# Patient Record
Sex: MALE | NOT HISPANIC OR LATINO | URBAN - METROPOLITAN AREA
[De-identification: names, ages, dates, MRNs, and addresses within clinical notes are randomized per-mention and may not be internally consistent; named-entity substitution may affect disease eponyms.]

---

## 2024-03-25 ENCOUNTER — HOSPITAL ENCOUNTER (OUTPATIENT)
Dept: RADIOLOGY | Facility: CLINIC | Age: 69
Discharge: HOME | End: 2024-03-25
Payer: MEDICARE

## 2024-03-25 DIAGNOSIS — Z13.6 ENCOUNTER FOR SCREENING FOR CARDIOVASCULAR DISORDERS: ICD-10-CM

## 2024-03-25 DIAGNOSIS — E78.2 MIXED HYPERLIPIDEMIA: ICD-10-CM

## 2024-03-25 DIAGNOSIS — I25.10 ATHEROSCLEROTIC HEART DISEASE OF NATIVE CORONARY ARTERY WITHOUT ANGINA PECTORIS: ICD-10-CM

## 2024-03-25 DIAGNOSIS — I10 ESSENTIAL (PRIMARY) HYPERTENSION: ICD-10-CM

## 2024-03-25 PROCEDURE — 75571 CT HRT W/O DYE W/CA TEST: CPT

## 2024-04-12 ENCOUNTER — APPOINTMENT (OUTPATIENT)
Dept: CARDIOLOGY | Facility: HOSPITAL | Age: 69
End: 2024-04-12
Payer: MEDICARE

## 2024-04-12 ENCOUNTER — HOSPITAL ENCOUNTER (EMERGENCY)
Facility: HOSPITAL | Age: 69
Discharge: HOME | End: 2024-04-13
Attending: STUDENT IN AN ORGANIZED HEALTH CARE EDUCATION/TRAINING PROGRAM
Payer: MEDICARE

## 2024-04-12 VITALS
SYSTOLIC BLOOD PRESSURE: 151 MMHG | BODY MASS INDEX: 27.2 KG/M2 | RESPIRATION RATE: 18 BRPM | HEIGHT: 70 IN | TEMPERATURE: 98.4 F | HEART RATE: 70 BPM | OXYGEN SATURATION: 95 % | WEIGHT: 190 LBS | DIASTOLIC BLOOD PRESSURE: 79 MMHG

## 2024-04-12 DIAGNOSIS — R45.851 THREATENING SUICIDE: Primary | ICD-10-CM

## 2024-04-12 LAB
ALBUMIN SERPL BCP-MCNC: 4.6 G/DL (ref 3.4–5)
ALP SERPL-CCNC: 61 U/L (ref 33–136)
ALT SERPL W P-5'-P-CCNC: 32 U/L (ref 10–52)
ANION GAP SERPL CALC-SCNC: 17 MMOL/L (ref 10–20)
APAP SERPL-MCNC: <10 UG/ML
AST SERPL W P-5'-P-CCNC: 33 U/L (ref 9–39)
BASOPHILS # BLD AUTO: 0.01 X10*3/UL (ref 0–0.1)
BASOPHILS NFR BLD AUTO: 0.2 %
BILIRUB SERPL-MCNC: 0.7 MG/DL (ref 0–1.2)
BUN SERPL-MCNC: 20 MG/DL (ref 6–23)
CALCIUM SERPL-MCNC: 9.9 MG/DL (ref 8.6–10.3)
CHLORIDE SERPL-SCNC: 103 MMOL/L (ref 98–107)
CO2 SERPL-SCNC: 26 MMOL/L (ref 21–32)
CREAT SERPL-MCNC: 1.08 MG/DL (ref 0.5–1.3)
EGFRCR SERPLBLD CKD-EPI 2021: 76 ML/MIN/1.73M*2
EOSINOPHIL # BLD AUTO: 0.25 X10*3/UL (ref 0–0.7)
EOSINOPHIL NFR BLD AUTO: 5.5 %
ERYTHROCYTE [DISTWIDTH] IN BLOOD BY AUTOMATED COUNT: 12.1 % (ref 11.5–14.5)
ETHANOL SERPL-MCNC: 154 MG/DL
GLUCOSE SERPL-MCNC: 91 MG/DL (ref 74–99)
HCT VFR BLD AUTO: 45.3 % (ref 41–52)
HGB BLD-MCNC: 15.8 G/DL (ref 13.5–17.5)
IMM GRANULOCYTES # BLD AUTO: 0.02 X10*3/UL (ref 0–0.7)
IMM GRANULOCYTES NFR BLD AUTO: 0.4 % (ref 0–0.9)
LYMPHOCYTES # BLD AUTO: 1.13 X10*3/UL (ref 1.2–4.8)
LYMPHOCYTES NFR BLD AUTO: 24.7 %
MCH RBC QN AUTO: 31.5 PG (ref 26–34)
MCHC RBC AUTO-ENTMCNC: 34.9 G/DL (ref 32–36)
MCV RBC AUTO: 90 FL (ref 80–100)
MONOCYTES # BLD AUTO: 0.51 X10*3/UL (ref 0.1–1)
MONOCYTES NFR BLD AUTO: 11.1 %
NEUTROPHILS # BLD AUTO: 2.66 X10*3/UL (ref 1.2–7.7)
NEUTROPHILS NFR BLD AUTO: 58.1 %
NRBC BLD-RTO: 0 /100 WBCS (ref 0–0)
PLATELET # BLD AUTO: 191 X10*3/UL (ref 150–450)
POTASSIUM SERPL-SCNC: 3.9 MMOL/L (ref 3.5–5.3)
PROT SERPL-MCNC: 7 G/DL (ref 6.4–8.2)
RBC # BLD AUTO: 5.02 X10*6/UL (ref 4.5–5.9)
SALICYLATES SERPL-MCNC: <3 MG/DL
SODIUM SERPL-SCNC: 142 MMOL/L (ref 136–145)
WBC # BLD AUTO: 4.6 X10*3/UL (ref 4.4–11.3)

## 2024-04-12 PROCEDURE — 93005 ELECTROCARDIOGRAM TRACING: CPT

## 2024-04-12 PROCEDURE — 80143 DRUG ASSAY ACETAMINOPHEN: CPT | Performed by: PHYSICIAN ASSISTANT

## 2024-04-12 PROCEDURE — 36415 COLL VENOUS BLD VENIPUNCTURE: CPT | Performed by: PHYSICIAN ASSISTANT

## 2024-04-12 PROCEDURE — 80053 COMPREHEN METABOLIC PANEL: CPT | Performed by: PHYSICIAN ASSISTANT

## 2024-04-12 PROCEDURE — 99285 EMERGENCY DEPT VISIT HI MDM: CPT | Performed by: STUDENT IN AN ORGANIZED HEALTH CARE EDUCATION/TRAINING PROGRAM

## 2024-04-12 PROCEDURE — 85025 COMPLETE CBC W/AUTO DIFF WBC: CPT | Performed by: PHYSICIAN ASSISTANT

## 2024-04-12 SDOH — HEALTH STABILITY: MENTAL HEALTH: NEEDS EXPRESSED: EMOTIONAL

## 2024-04-12 SDOH — SOCIAL STABILITY: SOCIAL NETWORK: EMOTIONAL SUPPORT GIVEN: REASSURE

## 2024-04-12 SDOH — HEALTH STABILITY: MENTAL HEALTH

## 2024-04-12 SDOH — HEALTH STABILITY: MENTAL HEALTH: BEHAVIORS/MOOD: APPROPRIATE FOR AGE;CALM;COOPERATIVE;VERBAL;TEARFUL

## 2024-04-12 ASSESSMENT — PAIN - FUNCTIONAL ASSESSMENT: PAIN_FUNCTIONAL_ASSESSMENT: 0-10

## 2024-04-12 ASSESSMENT — PAIN SCALES - GENERAL
PAINLEVEL_OUTOF10: 0 - NO PAIN
PAINLEVEL_OUTOF10: 0 - NO PAIN

## 2024-04-13 ENCOUNTER — DOCUMENTATION (OUTPATIENT)
Dept: SOCIAL WORK | Age: 69
End: 2024-04-13
Payer: MEDICARE

## 2024-04-13 LAB
AMPHETAMINES UR QL SCN: NORMAL
APPEARANCE UR: CLEAR
BARBITURATES UR QL SCN: NORMAL
BENZODIAZ UR QL SCN: NORMAL
BILIRUB UR STRIP.AUTO-MCNC: NEGATIVE MG/DL
BZE UR QL SCN: NORMAL
CANNABINOIDS UR QL SCN: NORMAL
COLOR UR: ABNORMAL
FENTANYL+NORFENTANYL UR QL SCN: NORMAL
GLUCOSE UR STRIP.AUTO-MCNC: NEGATIVE MG/DL
HOLD SPECIMEN: NORMAL
KETONES UR STRIP.AUTO-MCNC: ABNORMAL MG/DL
LEUKOCYTE ESTERASE UR QL STRIP.AUTO: NEGATIVE
METHADONE UR QL SCN: NORMAL
NITRITE UR QL STRIP.AUTO: NEGATIVE
OPIATES UR QL SCN: NORMAL
OXYCODONE+OXYMORPHONE UR QL SCN: NORMAL
PCP UR QL SCN: NORMAL
PH UR STRIP.AUTO: 6 [PH]
PROT UR STRIP.AUTO-MCNC: NEGATIVE MG/DL
RBC # UR STRIP.AUTO: NEGATIVE /UL
SP GR UR STRIP.AUTO: 1.01
UROBILINOGEN UR STRIP.AUTO-MCNC: <2 MG/DL

## 2024-04-13 PROCEDURE — 81003 URINALYSIS AUTO W/O SCOPE: CPT | Performed by: PHYSICIAN ASSISTANT

## 2024-04-13 PROCEDURE — 80307 DRUG TEST PRSMV CHEM ANLYZR: CPT | Performed by: PHYSICIAN ASSISTANT

## 2024-04-13 SDOH — HEALTH STABILITY: MENTAL HEALTH: WISH TO BE DEAD (PAST 1 MONTH): NO

## 2024-04-13 SDOH — ECONOMIC STABILITY: HOUSING INSECURITY: FEELS SAFE LIVING IN HOME: YES

## 2024-04-13 SDOH — HEALTH STABILITY: MENTAL HEALTH: HAVE YOU EVER TRIED TO KILL YOURSELF?: NO

## 2024-04-13 SDOH — HEALTH STABILITY: MENTAL HEALTH: ARE YOU HAVING THOUGHTS OF KILLING YOURSELF RIGHT NOW?: NO

## 2024-04-13 SDOH — HEALTH STABILITY: MENTAL HEALTH: IN THE PAST WEEK, HAVE YOU BEEN HAVING THOUGHTS ABOUT KILLING YOURSELF?: YES

## 2024-04-13 SDOH — HEALTH STABILITY: MENTAL HEALTH: ACTIVE SUICIDAL IDEATION WITH SOME INTENT TO ACT, WITHOUT SPECIFIC PLAN (PAST 1 MONTH): NO

## 2024-04-13 SDOH — HEALTH STABILITY: MENTAL HEALTH: NON-SPECIFIC ACTIVE SUICIDAL THOUGHTS (PAST 1 MONTH): YES

## 2024-04-13 SDOH — HEALTH STABILITY: MENTAL HEALTH: SUICIDAL BEHAVIOR (3 MONTHS): NO

## 2024-04-13 SDOH — HEALTH STABILITY: MENTAL HEALTH: IN THE PAST FEW WEEKS, HAVE YOU WISHED YOU WERE DEAD?: NO

## 2024-04-13 SDOH — HEALTH STABILITY: MENTAL HEALTH: DEPRESSION SYMPTOMS: NO PROBLEMS REPORTED OR OBSERVED.

## 2024-04-13 SDOH — HEALTH STABILITY: MENTAL HEALTH: IN THE PAST FEW WEEKS, HAVE YOU FELT THAT YOU OR YOUR FAMILY WOULD BE BETTER OFF IF YOU WERE DEAD?: NO

## 2024-04-13 SDOH — HEALTH STABILITY: MENTAL HEALTH: SUICIDAL BEHAVIOR (LIFETIME): NO

## 2024-04-13 SDOH — HEALTH STABILITY: MENTAL HEALTH: ANXIETY SYMPTOMS: NO PROBLEMS REPORTED OR OBSERVED.

## 2024-04-13 SDOH — HEALTH STABILITY: MENTAL HEALTH: ACTIVE SUICIDAL IDEATION WITH SPECIFIC PLAN AND INTENT (PAST 1 MONTH): NO

## 2024-04-13 ASSESSMENT — LIFESTYLE VARIABLES
SUBSTANCE_ABUSE_PAST_12_MONTHS: NO
PRESCIPTION_ABUSE_PAST_12_MONTHS: NO

## 2024-04-13 NOTE — ED PROVIDER NOTES
HPI   Chief Complaint   Patient presents with    Psychiatric Evaluation       Is a 68-year-old male coming in after being pink slipped by the police.  Patient was at home in an argument with his significant other.  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  Since that time patient has had increased arguing with his significant other.  Patient also reports daily alcohol use after multiple years sober within the last year.  Patient denies any drug use.  He does not take any daily medications.  He states he saw therapists and counselors for his arguments with his significant other in the past however currently does not.  Today patient states that the argument was louder and heavier than usual.  The significant other told him that she wished that he was dead.  He states he went downstairs and grabbed a gun and gave it to her and told her to shoot him.  Patient was then brought in here by police.  He states he regrets what he said.  He does not feel suicidal or homicidal.      History provided by:  Patient                      [place] Coma Scale Score: 15                     Patient History   No past medical history on file.  No past surgical history on file.  No family history on file.  Social History     Tobacco Use    Smoking status: Not on file    Smokeless tobacco: Not on file   Substance Use Topics    Alcohol use: Not on file    Drug use: Not on file       Physical Exam   ED Triage Vitals [04/12/24 2236]   Temperature Heart Rate Respirations BP   36.9 °C (98.4 °F) 70 18 151/79      Pulse Ox Temp Source Heart Rate Source Patient Position   95 % Temporal Monitor Lying      BP Location FiO2 (%)     Right arm --       Physical Exam  Vitals and nursing note reviewed.   Constitutional:       General: He is not in acute distress.     Appearance: Normal appearance. He is not toxic-appearing.   HENT:      Head: Normocephalic and  "atraumatic.      Nose: Nose normal.      Mouth/Throat:      Mouth: Mucous membranes are moist.      Pharynx: Oropharynx is clear.   Eyes:      Extraocular Movements: Extraocular movements intact.      Conjunctiva/sclera: Conjunctivae normal.      Pupils: Pupils are equal, round, and reactive to light.   Cardiovascular:      Rate and Rhythm: Regular rhythm.      Pulses: Normal pulses.      Heart sounds: Normal heart sounds.   Pulmonary:      Effort: Pulmonary effort is normal. No respiratory distress.      Breath sounds: Normal breath sounds.   Abdominal:      General: Abdomen is flat. Bowel sounds are normal.      Palpations: Abdomen is soft.      Tenderness: There is no abdominal tenderness.   Musculoskeletal:         General: Normal range of motion.      Cervical back: Normal range of motion and neck supple.   Skin:     General: Skin is warm and dry.      Coloration: Skin is not jaundiced or pale.      Findings: No bruising.   Neurological:      General: No focal deficit present.      Mental Status: He is alert and oriented to person, place, and time. Mental status is at baseline.   Psychiatric:         Mood and Affect: Mood normal.         Speech: Speech normal.         Behavior: Behavior normal. Behavior is cooperative.         Thought Content: Thought content normal. Thought content does not include homicidal or suicidal ideation. Thought content does not include homicidal or suicidal plan.         ED Course & MDM   ED Course as of 04/13/24 0019   Sat Apr 13, 2024   0008 This is a 68-year-old male present with chief complaint of psych evaluation. [WL]   0009  [place] slip by Asher HORN. Pt was making suicidal threats to his wife. Back story is that pt was emotionally distressed and his wife said, \"you should be dead\" and pt went to basement, got an unloaded shotgun and gave it to his wife and said, \"fine, you do it.\" Pt states he had \"a couple drinks but nothing to impair my judgement.\"  [WL]      ED Course User " Index  [WL] Mumtaz Kelley DO       Medical Decision Making  Summary:  Medical Decision Making:   Patient presented as described in HPI. Patient case including ROS, PE, and treatment and plan discussed with ED attending if attached as cosigner. Results from labs and or imaging included below if completed. Jorge Stratton  is a 68 y.o. coming in for Patient presents with:  Psychiatric Evaluation  .  Due to patient's actions that include grabbing a gun a workup will be completed to medically evaluate the patient.  Alcohol is slightly elevated.  Clinically I do believe that the patient is sober.  Awaiting urinalysis and urine drug screen.  Patient denies drug use.  He is medically cleared at 12:20 AM.  Psychiatric team evaluation was placed and patient will be handed off to ER attending for disposition and plan after psychiatric team assessment.          Labs Reviewed  ACUTE TOXICOLOGY PANEL, BLOOD - Abnormal     Acetaminophen                 <10.0                  Salicylate                    <3                     Alcohol                       154 (*)             CBC WITH AUTO DIFFERENTIAL - Abnormal     WBC                           4.6                    nRBC                          0.0                    RBC                           5.02                   Hemoglobin                    15.8                   Hematocrit                    45.3                   MCV                           90                     MCH                           31.5                   MCHC                          34.9                   RDW                           12.1                   Platelets                     191                    Neutrophils %                 58.1                   Immature Granulocytes %, Automated   0.4                    Lymphocytes %                 24.7                   Monocytes %                   11.1                   Eosinophils %                 5.5                    Basophils %                    0.2                    Neutrophils Absolute          2.66                   Immature Granulocytes Absolute, Au*   0.02                   Lymphocytes Absolute          1.13 (*)               Monocytes Absolute            0.51                   Eosinophils Absolute          0.25                   Basophils Absolute            0.01                COMPREHENSIVE METABOLIC PANEL - Normal     Glucose                       91                     Sodium                        142                    Potassium                     3.9                    Chloride                      103                    Bicarbonate                   26                     Anion Gap                     17                     Urea Nitrogen                 20                     Creatinine                    1.08                   eGFR                          76                     Calcium                       9.9                    Albumin                       4.6                    Alkaline Phosphatase          61                     Total Protein                 7.0                    AST                           33                     Bilirubin, Total              0.7                    ALT                           32                  URINALYSIS WITH REFLEX CULTURE AND MICROSCOPIC       Narrative: The following orders were created for panel order Urinalysis with Reflex Culture and Microscopic.                Procedure                               Abnormality         Status                                   ---------                               -----------         ------                                   Urinalysis with Reflex C...[[id number]]                      In process                               Extra Urine Gray Tube[[id number]]                            In process                                               Please view results for these tests on the individual orders.  DRUG SCREEN,URINE  URINALYSIS WITH REFLEX CULTURE  "AND MICROSCOPIC  EXTRA URINE GRAY TUBE   No orders to display      Tests/Medications/Escalations of Care considered but not given: As in [facility]    Patient care discussed with: N/A  Social Determinants affecting care: N/A    Final diagnosis and disposition as documented     ED Course as of 04/13/24 0022  ------------------------------------------------------------  Time: 04/13 0008  Comment: This is a 68-year-old male present with chief complaint of psych evaluation.  By: Mumtaz Kelley,   ------------------------------------------------------------  Time: 04/13 0009  Comment:  [place] slip by Asher HORN. Pt was making suicidal threats to his wife. Back story is that pt was emotionally distressed and his wife said, \"you should be dead\" and pt went to basement, got an unloaded shotgun and gave it to his wife and said, \"fine, you do it.\" Pt states he had \"a couple drinks but nothing to impair my judgement.\"   By: Mumtaz Kelley,            This note has been transcribed using voice recognition and may contain grammatical errors, misplaced words, incorrect words, incorrect phrases or other errors.         Procedure  Procedures     Bernardo Hendrix PA-C  04/13/24 0023    "

## 2024-04-13 NOTE — ED TRIAGE NOTES
"Pt BIB EMS f/ home. [place] slip by Asher HORN. Pt was making suicidal threats to his wife. Back story is that pt was emotionally distressed and his wife said, \"you should be dead\" and pt went to basement, got an unloaded shotgun and gave it to his wife and said, \"fine, you do it.\" Pt states he had \"a couple drinks but nothing to impair my judgement.\" Pt calm, cooperative, NAD, VSS en route. Currently denies SI or HI. Voluntary to psych evaluation.   "

## 2024-04-13 NOTE — PROGRESS NOTES
EPAT - Social Work Psychiatric Assessment    Arrival Details  Mode of Arrival: Ambulance  Admission Source: Home  Admission Type: Involuntary  EPAT Assessment Start Date: 04/13/24  EPAT Assessment Start Time: 0025  Name of : IVÁN Whittington LSW    History of Present Illness  Admission Reason: suicidal ideation  HPI: Patient is a 68 year old  male, with no psych history, pink slipped by PD to the ED for suicidal gestures. ED provider note, nursing notes, [place] suicide risk scale and community records reviewed, patient reportedly has been having escalating arguments with spouse in the past several days. Today during argument, her wife said “you should be dead”, patient went to basement, got an unloaded shotgun and gave it to wife saying “fine, you do it”. Upon ED arrival, patient denies suicidal/homicidal ideation, visual/auditory hallucinations or delusional thinking. Triage indicates high risk, BAL at 154 and negative UDS. Patient has never been diagnosed with any psychiatric illness, he has been to couple counseling with spouse in the past. Denies previous SA or NSSIB.     Readmission Information   Readmission within 30 Days: No    Psychiatric Symptoms  Anxiety Symptoms: No problems reported or observed.  Depression Symptoms: No problems reported or observed.  Yajaira Symptoms: No problems reported or observed.    Psychosis Symptoms  Hallucination Type: No problems reported or observed.  Delusion Type: No problems reported or observed.    Additional Symptoms - Adult  Generalized Anxiety Disorder: Difficult to control worry, Difficulity concentrating, Excessive anxiety/worry, Easily fatigued, Irritability  Obsessive Compulsive Disorder: No problems reported or observed.  Panic Attack: No problems reported or observed.  Post Traumatic Stress Disorder: No problems reported or observed.  Delirium: No problems reported or observed.    Past Psychiatric History/Meds/Treatments  Past Psychiatric History:  no prior admissions // no hx of SA or NSSIB // denies family hx // denies trauma hx  Past Psychiatric Meds/Treatments: none  Past Violence/Victimization History: none    Current Mental Health Contacts   Name/Phone Number: none   Last Appointment Date: none  Provider Name/Phone Number: none  Provider Last Appointment Date: none    Support System: Immediate family    Living Arrangement: House    Home Safety  Feels Safe Living in Home: Yes    Income Information  Employment Status for: Patient  Employment Status: Employed  Income Source: Employed  Current/Previous Occupation:  (finance)    [place] Service/Education History  Current or Previous  Service: None  Education Level:  (did not assess)    Social/Cultural History  Social History: [place] citizen, living with spouse in a house in [place] [facility]. 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 Has two adult sons.  Cultural Requests During Hospitalization: none  Spiritual Requests During Hospitalization: none  Important Activities: Hobbies (bird hunting, training hunting dogs)    Legal  Legal Considerations: Patient/ Family Ability to Make Healthcare Decisions  Assistance with Managing/Advocating Healthcare Needs:  (self)  Criminal Activity/ Legal Involvement Pertinent to Current Situation/ Hospitalization: none    Drug Screening  Have you used any substances (canabis, cocaine, heroin, hallucinogens, inhalants, etc.) in the past 12 months?: No  Have you used any prescription drugs other than prescribed in the past 12 months?: No  Is a toxicology screen needed?: Yes    Stage of Change  Stage of Change: Preparation  History of Treatment:  (none)  Type of Treatment Offered: AA/NA meeting resource  Treatment Offered: Declined  Duration of Substance Use: years  Frequency of Substance Use: been sober for 4 years in the past, restarted drinking 1.5 years ago, drinks daily  Age of First Substance Use: unknown    Psychosocial  Psychosocial (WDL):  Within Defined Limits  Behaviors/Mood: Cooperative    Orientation  Orientation Level: Oriented X4    General Appearance  Motor Activity: Unremarkable  Speech Pattern:  (regular rate and tone)  General Attitude: Cooperative  Appearance/Hygiene: Unremarkable    Thought Process  Coherency:  (linear)  Content: Unremarkable  Delusions:  (none)  Perception: Not altered  Hallucination: None  Judgment/Insight:  (fair)  Confusion: None  Cognition: Impulsive    Sleep Pattern  Sleep Pattern: Disturbed/interrupted sleep    Risk Factors  Self Harm/Suicidal Ideation Plan: gestures with gun  Previous Self Harm/Suicidal Plans: none  Risk Factors: Male,  history or weapons training, Presence of firearms in home    Violence Risk Assessment  Assessment of Violence: None noted  Thoughts of Harm to Others: No    Ability to Assess Risk Screen  Risk Screen - Ability to Assess: Able to be screened  Ask Suicide-Screening Questions  1. In the past few weeks, have you wished you were dead?: No  2. In the past few weeks, have you felt that you or your family would be better off if you were dead?: No  3. In the past week, have you been having thoughts about killing yourself?: Yes  4. Have you ever tried to kill yourself?: No  5. Are you having thoughts of killing yourself right now?: No  Calculated Risk Score: Potential Risk  [place] Suicide Severity Rating Scale (Screener/Recent Self-Report)  1. Wish to be Dead (Past 1 Month): No  2. Non-Specific Active Suicidal Thoughts (Past 1 Month): Yes  3. Active Suicidal Ideation with any Methods (Not Plan) Without Intent to Act (Past 1 Month): No  4. Active Suicidal Ideation with Some Intent to Act, Without Specific Plan (Past 1 Month): No  5. Active Suicidal Ideation with Specific Plan and Intent (Past 1 Month): No  6. Suicidal Behavior (Lifetime): No  6. Suicidal Behavior (3 Months): No  Calculated C-SSRS Risk Score (Lifetime/Recent): Low Risk  Step 1: Risk Factors  Current & Past Psychiatric  Dx: Mood disorder  Presenting Symptoms: Anxiety and/or panic  Precipitants/Stressors: Triggering events leading to humiliation, shame, and/or despair (e.g. loss of relationship, financial or health status) (real or anticipated)  Change in Treatment: Non-compliant or not receiving treatment  Access to Lethal Methods : Yes (4 unloaded in safe, one unloaded and taken by PD)  Step 2: Protective Factors   Protective Factors Internal: Ability to cope with stress, Frustration tolerance  Protective Factors External: Cultural, spiritual and/or moral attitudes against suicide  Step 3: Suicidal Ideation Intensity  Most Severe Suicidal Ideation Identified: gesture with gun  How Many Times Have You Had These Thoughts: Less than once a week  When You Have the Thoughts How Long do They Last : Fleeting - few seconds or minutes  Could/Can You Stop Thinking About Killing Yourself or Wanting to Die if You Want to: Easily able to control thoughts  Are There Things - Anyone or Anything - That Stopped You From Wanting to Die or Acting on: Deterrents definitely stopped you from attempting suicide  What Sort of Reasons Did You Have For Thinking About Wanting to Die or Killing Yourself: Completely to get attention, revenge, or a reaction from others  Total Score: 5  Step 5: Documentation  Risk Level: Low suicide risk    Patient is a 68 year old  male, with no psych history, pink slipped by PD to the ED for suicidal gestures. Prior to assessment, patient is calm and cooperative in the ED, comply with all lab works. Upon assessment, he presents as euthymic with affect congruent to mood. Patient endorses difficulty controlling worries, excessive worries, irritability and impulsivity. He denies suicidal/homicidal ideation, visual/auditory hallucinations or delusional thinking. He reports he is residing with spouse in a house and feels safe living there. Patient states he and spouse were watching DVD together when the dogs came in, “she  was upset with me and yelled at dogs”, “I made some passive suicidal statements and put out the gun”, “I went a little bit too extreme tonight”. He denies active thoughts or intention to end his life. When being asked about his stressors, he states “my work sucks, we have to plan for detention with our money and we got into arguments all the time”. Patient admits to having five guns in the household, four unloaded and locked in safe, one shotgun that he uses for bird hunting most frequently is unloaded outside the safe. He reports his spouse has a diagnosis of BPD and has been to couple counseling in the past, otherwise has never received any mental health treatments in the past. Patient does not seem internally stimulated or under acute distress. He is able to demonstrate future orientation, coping mechanisms, and great social support.     Spoke with spouse Yeni, she reports no concerns for patient. She states they got into arguments very frequently “due to her BPD diagnosis”, stating “he will blame everything on my BPD, he always says it is awful marrying someone with BPD”. Spouse states patient will make suicidal statements when having arguments, “he will say he should be dead, he should have killed himself”. She reports patient has been bird hunting since 17 years old and has a group of friends he hangs out with, he has three hunting bird dogs, however one passed away in 12/2023, one recently was diagnosed with untreatable cancer, one is elderly and they recently got a new puppy. Spouse states taking care of dogs, stress from work, and mentoring a young men out of [facility] “have been escalating their arguments”. Spouse states she agrees today's incident is a situation gesture, she has no concerns for patient actually harming himself. She is willing to keep an eye on him and states one of their adult son is coming home for the weekend to stay with him.     Patient does not meet criteria for inpatient admission  today as he is not posing an immediate risk of harm to himself or others, or being gravely disabled by his mental health. Advised patient to follow up with outpatient providers, [place] resources provided. He is safe to be discharged at this time, Bernardo SMITH in agreement.        Psychiatric Impression and Plan of Care  Assessment and Plan: see above  Specific Resources Provided to Patient: [email or web address]  [facility] Notified: none  PHP/IOP Recommended: none    Outcome/Disposition  Patient's Perception of Outcome Achieved: patient agrees  Assessment, Recommendations and Risk Level Reviewed with: Bernardo SMITH  Contact Name: Yeni Stratton  Contact Number(s): [phone]  Contact Relationship: spouse  EPAT Assessment Completed Date: 04/13/24  EPAT Assessment Completed Time: 0100  Patient Disposition: Home

## 2024-04-13 NOTE — DISCHARGE INSTRUCTIONS
Call tomorrow to schedule follow-up appointment.    [facility] [phone]

## 2024-04-15 LAB
ATRIAL RATE: 64 BPM
P AXIS: 80 DEGREES
P OFFSET: 171 MS
P ONSET: 124 MS
PR INTERVAL: 196 MS
Q ONSET: 222 MS
QRS COUNT: 10 BEATS
QRS DURATION: 98 MS
QT INTERVAL: 394 MS
QTC CALCULATION(BAZETT): 406 MS
QTC FREDERICIA: 402 MS
R AXIS: -28 DEGREES
T AXIS: 62 DEGREES
T OFFSET: 419 MS
VENTRICULAR RATE: 64 BPM